# Patient Record
Sex: MALE | Race: BLACK OR AFRICAN AMERICAN | NOT HISPANIC OR LATINO | URBAN - METROPOLITAN AREA
[De-identification: names, ages, dates, MRNs, and addresses within clinical notes are randomized per-mention and may not be internally consistent; named-entity substitution may affect disease eponyms.]

---

## 2023-10-04 ENCOUNTER — HOSPITAL ENCOUNTER (EMERGENCY)
Facility: HOSPITAL | Age: 58
Discharge: HOME | End: 2023-10-04
Attending: STUDENT IN AN ORGANIZED HEALTH CARE EDUCATION/TRAINING PROGRAM
Payer: COMMERCIAL

## 2023-10-04 VITALS
RESPIRATION RATE: 18 BRPM | SYSTOLIC BLOOD PRESSURE: 110 MMHG | WEIGHT: 150 LBS | HEIGHT: 66 IN | BODY MASS INDEX: 24.11 KG/M2 | DIASTOLIC BLOOD PRESSURE: 71 MMHG | OXYGEN SATURATION: 99 % | HEART RATE: 71 BPM | TEMPERATURE: 99 F

## 2023-10-04 DIAGNOSIS — M25.469 PAIN AND SWELLING OF KNEE: Primary | ICD-10-CM

## 2023-10-04 DIAGNOSIS — M25.461 EFFUSION OF RIGHT KNEE: ICD-10-CM

## 2023-10-04 DIAGNOSIS — M25.569 PAIN AND SWELLING OF KNEE: Primary | ICD-10-CM

## 2023-10-04 PROCEDURE — 29505 APPLICATION LONG LEG SPLINT: CPT | Mod: RT

## 2023-10-04 PROCEDURE — 96372 THER/PROPH/DIAG INJ SC/IM: CPT | Performed by: PHYSICIAN ASSISTANT

## 2023-10-04 PROCEDURE — 63600175 PHARM REV CODE 636 W HCPCS: Performed by: PHYSICIAN ASSISTANT

## 2023-10-04 PROCEDURE — 99284 EMERGENCY DEPT VISIT MOD MDM: CPT | Mod: 25

## 2023-10-04 RX ORDER — DICLOFENAC SODIUM 50 MG/1
50 TABLET, DELAYED RELEASE ORAL 3 TIMES DAILY PRN
Qty: 15 TABLET | Refills: 0 | Status: SHIPPED | OUTPATIENT
Start: 2023-10-04 | End: 2023-10-09

## 2023-10-04 RX ORDER — KETOROLAC TROMETHAMINE 30 MG/ML
15 INJECTION, SOLUTION INTRAMUSCULAR; INTRAVENOUS
Status: COMPLETED | OUTPATIENT
Start: 2023-10-04 | End: 2023-10-04

## 2023-10-04 RX ADMIN — KETOROLAC TROMETHAMINE 15 MG: 30 INJECTION INTRAMUSCULAR; INTRAVENOUS at 07:10

## 2023-10-04 NOTE — Clinical Note
"Uziel Lopezniranjan Castillo was seen and treated in our emergency department on 10/4/2023.  He may return to work on 10/09/2023.       If you have any questions or concerns, please don't hesitate to call.       RN    "

## 2023-10-04 NOTE — ED TRIAGE NOTES
Uziel Castillo, a 58 y.o. male presents to the ED w/ complaint of right knee pain x1 week, pt denies any recent injury    Triage note:  Chief Complaint   Patient presents with    Knee Pain     Right knee pain x 1 week with intermittent swelling. No trauma to extremity. Ambulatory, painful to flex/extend.      Review of patient's allergies indicates:  No Known Allergies  No past medical history on file.

## 2023-10-04 NOTE — ED PROVIDER NOTES
"Encounter Date: 10/4/2023       History     Chief Complaint   Patient presents with    Knee Pain     Right knee pain x 1 week with intermittent swelling. No trauma to extremity. Ambulatory, painful to flex/extend.      The patient is a 58-year-old male.  He presents to the ER complaining of an acute exacerbation chronic right knee pain.  He states that he was told he had a meniscus tear in his right knee, but he never followed up.  He states that usually the degree of pain to the knee is minimal and tolerable.  However over the past few days he has had increased pain and swelling, worse than usual.  He states that he twisted his left "good" knee a couple of weeks ago while getting out of bed and that he has since compensated by shifting the majority of his weight onto his right knee when walking and he believes that this increased workload favoring his right knee has led to his current predicament.  He states that the left knee is now completely better, but that he can barely put any weight on the right knee due to the degree of pain he is experiencing.  He denies any fever or chills.  He denies any calf pain or swelling.  He denies any chest pain or shortness of breath.      Review of patient's allergies indicates:  No Known Allergies  Past Medical History:   Diagnosis Date    Chronic pain of right knee      History reviewed. No pertinent surgical history.  No family history on file.  Social History     Tobacco Use    Smoking status: Never    Smokeless tobacco: Never   Substance Use Topics    Alcohol use: Not Currently    Drug use: Not Currently     Review of Systems   Constitutional:  Negative for chills and fever.   Respiratory:  Negative for shortness of breath and wheezing.    Cardiovascular:  Negative for chest pain, palpitations and leg swelling.   Gastrointestinal:  Negative for abdominal pain, diarrhea, nausea and vomiting.   Genitourinary:  Negative for decreased urine volume, difficulty urinating, " dysuria, penile discharge and testicular pain.   Musculoskeletal:  Positive for arthralgias and joint swelling.   Skin:  Negative for color change, rash and wound.   Allergic/Immunologic: Negative for immunocompromised state.   Neurological:  Negative for dizziness, syncope, speech difficulty, weakness, light-headedness, numbness and headaches.   Psychiatric/Behavioral:  Negative for confusion.        Physical Exam     Initial Vitals [10/04/23 1651]   BP Pulse Resp Temp SpO2   107/74 76 18 98.8 °F (37.1 °C) 99 %      MAP       --         Physical Exam    Nursing note and vitals reviewed.  Constitutional: He appears well-developed and well-nourished. He is not diaphoretic.   Alert and ambulatory.  Antalgic gait.  Accompanied by his wife   HENT:   Head: Normocephalic.   Eyes: Conjunctivae are normal.   Neck: Neck supple.   Cardiovascular:  Normal rate.           2+ PT and DP pulses   Pulmonary/Chest: No respiratory distress.   Abdominal: He exhibits no distension. There is no abdominal tenderness.   Musculoskeletal:      Cervical back: Neck supple.      Comments: Right knee with mild suprapatellar effusion present; no obvious deformity.  No erythema or increased warmth.  Pain reported with passive and active range of motion, but is able to fully range.  Negative anterior-posterior drawer.  Negative varus valgus stress.  No calf pain or swelling.     Neurological: He is alert and oriented to person, place, and time. He has normal strength. No sensory deficit.   Skin: Skin is warm and dry. No rash and no abscess noted.   Psychiatric: He has a normal mood and affect. His behavior is normal.         ED Course   Orthopedic Injury    Date/Time: 10/4/2023 8:28 PM    Performed by: Tereso Salgado PA-C  Authorized by: Kemar Kaplan MD    Location procedure was performed:  [facility] EMERGENCY DEPARTMENT  Injury:     Injury location:  Knee    Location details:  Right knee    Injury type:  Soft tissue      Pre-procedure  assessment:     Neurovascular status: Neurovascularly intact      Distal perfusion: normal      Neurological function: normal      Range of motion: reduced        Selections made in this section will also lock the Injury type section above.:     Immobilization:  Splint and crutches    Splint type: Knee immobilizer.    Complications: No    Post-procedure assessment:     Neurovascular status: Neurovascularly intact      Distal perfusion: normal      Neurological function: normal      Range of motion: splinted      Patient tolerance:  Patient tolerated the procedure well with no immediate complications    Labs Reviewed - No data to display         Imaging Results              X-Ray Knee 3 View Right (Final result)  Result time 10/04/23 20:24:55      Final result by Isra Atwood MD (10/04/23 20:24:55)                   Impression:      No acute fracture.      Electronically signed by: Isra Atwood MD  Date:    10/04/2023  Time:    20:24               Narrative:    EXAMINATION:  XR KNEE 3 VIEW RIGHT    CLINICAL HISTORY:  Pain in unspecified knee    TECHNIQUE:  AP, lateral, and Merchant views of the right knee were performed.    COMPARISON:  None    FINDINGS:  No fracture or dislocation.  No large joint effusion.  Lateral tilt of the patella on the sunrise view.  Cartilage spaces are maintained on nonweightbearing views.                                       Medications   ketorolac injection 15 mg (15 mg Intramuscular Given 10/4/23 1935)     Medical Decision Making  58-year-old male who presents to the ER complaining of an acute exacerbation of chronic right knee pain.  Patient reports history of right meniscus tear in the past that he never followed up, now with acute increased pain and swelling of right knee over the past few days    Amount and/or Complexity of Data Reviewed  Radiology: ordered.     Details: Right knee radiographs obtained, no fracture or dislocation, no acute findings identified.  Discussion  of management or test interpretation with external provider(s): I discussed the case in detail with the ER attending physician.  I considered but do not suspect acute DVT or septic joint based on history and exam.  Knee immobilizer and crutches were provided with instructions on proper use.  Ambulatory referral to orthopedic was ordered and patient was advised to follow up with orthopedic clinic next week for re-evaluation and further management.  Patient will likely need outpatient MRI of knee.  Prescription for NSAID provided to take as needed for pain.  Patient advised to rest and elevate leg, limit steps.  Patient instructed to return to the ER promptly if worse in any way.  Patient verbalized understanding and comfort with plan.    Risk  Prescription drug management.      Additional MDM:     X-Rays: I have independently interpreted X-Ray(s) - see notes. No fracture dislocation, no acute findings                           Clinical Impression:   Final diagnoses:  [M25.569, M25.469] Pain and swelling of knee (Primary)  [M25.461] Effusion of right knee        ED Disposition Condition    Discharge Stable          ED Prescriptions       Medication Sig Dispense Start Date End Date Auth. Provider    diclofenac (VOLTAREN) 50 MG EC tablet Take 1 tablet (50 mg total) by mouth 3 (three) times daily as needed (Pain). 15 tablet 10/4/2023 10/9/2023 Tereso Salgado, PAMARK          Follow-up Information       Follow up With Specialties Details Why Contact Info Additional Information    [facility] 5th Fl Orthopedics  Follow up with primary care within the next 1-2 days.  Follow up with orthopedic clinic at next available.  Use crutches and knee immobilizer as directed. [address]  [facility] [address] [phone] [facility] - Main Building, 5th Floor Please park in [facility] and take Atrium elevator    [facility] Emergency [facility] Emergency Medicine  Return to the  ER promptly if worse in any way. [address]  [facility] [id number] [phone]              Tereso Salgado, PAChaoC  10/06/23 1409